# Patient Record
Sex: MALE | Race: WHITE
[De-identification: names, ages, dates, MRNs, and addresses within clinical notes are randomized per-mention and may not be internally consistent; named-entity substitution may affect disease eponyms.]

---

## 2021-02-16 ENCOUNTER — NURSE TRIAGE (OUTPATIENT)
Dept: OTHER | Facility: CLINIC | Age: 21
End: 2021-02-16

## 2021-02-16 NOTE — TELEPHONE ENCOUNTER
Reason for Disposition   Looks infected (e.g., spreading redness, pus, red streak)    Answer Assessment - Initial Assessment Questions  1. MECHANISM: \"How did the injury happen? \"     Box  cut    2. ONSET: \"When did the injury happen? \" (Minutes or hours ago)      3-4 weeks ago    3. LOCATION: \"What part of the finger is injured? \" \"Is the nail damaged? \"   Right pointer finger on knuckle    4. APPEARANCE of the INJURY: \"What does the injury look like? \"       Scabbed, red around scrape, pus filled    5. SEVERITY: \"Can you use the hand normally? \"  \"Can you bend your fingers into a ball and then fully open them? \"   yes, no    6. SIZE: For cuts, bruises, or swelling, ask: \"How large is it? \" (e.g., inches or centimeters;  entire finger)     Pea size    7. PAIN: \"Is there pain? \" If so, ask: \"How bad is the pain? \"    (e.g., Scale 1-10; or mild, moderate, severe)    Mild    8. TETANUS: For any breaks in the skin, ask: \"When was the last tetanus booster? \"      Unsure    9. OTHER SYMPTOMS: \"Do you have any other symptoms? \"  No    Protocols used: FINGER INJURY-ADULT-[place]    Brief description of triage: finger cut    Triage indicates for patient to be seen today    Care advice provided, patient verbalizes understanding; denies any other questions or concerns; instructed to call back for any new or worsening symptoms. This triage is a result of a call to [facility]. Please do not respond to the triage nurse through this encounter. Any subsequent communication should be directly with the patient.